# Patient Record
Sex: MALE
[De-identification: names, ages, dates, MRNs, and addresses within clinical notes are randomized per-mention and may not be internally consistent; named-entity substitution may affect disease eponyms.]

---

## 2024-08-09 ENCOUNTER — DASHBOARD ENCOUNTERS (OUTPATIENT)
Age: 24
End: 2024-08-09

## 2024-08-09 ENCOUNTER — OFFICE VISIT (OUTPATIENT)
Dept: URBAN - METROPOLITAN AREA CLINIC 82 | Facility: CLINIC | Age: 24
End: 2024-08-09
Payer: COMMERCIAL

## 2024-08-09 VITALS
BODY MASS INDEX: 27.89 KG/M2 | HEART RATE: 90 BPM | SYSTOLIC BLOOD PRESSURE: 130 MMHG | DIASTOLIC BLOOD PRESSURE: 86 MMHG | HEIGHT: 68 IN | WEIGHT: 184 LBS | TEMPERATURE: 97.5 F

## 2024-08-09 DIAGNOSIS — R10.13 EPIGASTRIC PAIN: ICD-10-CM

## 2024-08-09 PROBLEM — 79922009: Status: ACTIVE | Noted: 2024-08-09

## 2024-08-09 PROCEDURE — 99203 OFFICE O/P NEW LOW 30 MIN: CPT | Performed by: STUDENT IN AN ORGANIZED HEALTH CARE EDUCATION/TRAINING PROGRAM

## 2024-08-09 NOTE — HPI-TODAY'S VISIT:
23 y.o male presents today for c/o gastritis. Pt states that he was experiencing epigastric pain, SOB after consuming a large meal. +nausea, no vomiting. Happen suddenly so pt went to ER. Labs grossly unremarkable except for wbc around 13. Chest xray normal. Pt was d/c w pepcid, which did controll his sx but only took it for 15 days as instructed. He denies any NSAIDs. No ETOH. No smoking. Doesnt even consume acidic meals. No fhx of gastric cancer.

## 2024-09-06 ENCOUNTER — OFFICE VISIT (OUTPATIENT)
Dept: URBAN - METROPOLITAN AREA CLINIC 82 | Facility: CLINIC | Age: 24
End: 2024-09-06